# Patient Record
Sex: FEMALE | Race: BLACK OR AFRICAN AMERICAN | Employment: FULL TIME | URBAN - METROPOLITAN AREA
[De-identification: names, ages, dates, MRNs, and addresses within clinical notes are randomized per-mention and may not be internally consistent; named-entity substitution may affect disease eponyms.]

---

## 2017-03-27 ENCOUNTER — TELEPHONE (OUTPATIENT)
Dept: FAMILY MEDICINE CLINIC | Facility: CLINIC | Age: 52
End: 2017-03-27

## 2018-01-09 ENCOUNTER — HOSPITAL ENCOUNTER (OUTPATIENT)
Dept: MAMMOGRAPHY | Facility: HOSPITAL | Age: 53
Discharge: HOME | End: 2018-01-09
Attending: FAMILY MEDICINE
Payer: COMMERCIAL

## 2018-01-09 DIAGNOSIS — Z12.39 ENCOUNTER FOR BREAST CANCER SCREENING OTHER THAN MAMMOGRAM: ICD-10-CM

## 2018-01-09 PROCEDURE — 77067 SCR MAMMO BI INCL CAD: CPT | Performed by: FAMILY MEDICINE

## 2019-04-30 ENCOUNTER — HOSPITAL ENCOUNTER (OUTPATIENT)
Dept: MAMMOGRAPHY | Facility: HOSPITAL | Age: 54
Discharge: HOME | End: 2019-04-30
Attending: FAMILY MEDICINE
Payer: COMMERCIAL

## 2019-04-30 DIAGNOSIS — Z12.39 BREAST SCREENING: ICD-10-CM

## 2019-04-30 PROCEDURE — 77067 SCR MAMMO BI INCL CAD: CPT | Performed by: FAMILY MEDICINE

## 2019-05-21 ENCOUNTER — OFFICE VISIT (OUTPATIENT)
Dept: INTERNAL MEDICINE CLINIC | Facility: HOSPITAL | Age: 54
End: 2019-05-21

## 2019-05-21 NOTE — PROGRESS NOTES
Progress Notes           Patient: Tri Russ Provider: Gabby Preston DO   : 1965 Age: 48 Y Sex: Female Date: 2019   PCP: Murray Cochran MD              Reason for Appointment   1. Patient presents for a CPX.  Shane Gerard RN Surgical History   appendectomy    laparoscopy    hysteroscopy          Family History   Father: alive, prostate cancer, diabetes mellitus   Mother: alive, hypertension, hypercholesterolemia, osteoporosis   Paternal [address] Father: , pros Recreational drug use  Do you use any recreational or illegal drugs? No, Have you or any of your family member has been hospitalized for any psychiatric problem? No, Have you ever used needles to inject drugs?   No.   Tobacco Use  Are you a:  never smoker (32.0-36.0 g/dL)  34.2  (32.0-36.0 g/dL)  34.1  (32.0-36.0 g/dL)    RDW 13  (11-15 percent)  13.5  (11.0-15.0 %)  13.9  (11.0-15.0 %)  13.4  (11.0-15.0 %)  13.6  (11.0-15.0 %)    PLATELET COUNT 320  (150-450 k/cu mm)  296  (140-400 Thousand/uL)  244  (140- Notes: MARY TODD ,  01/18/2018 11:13:56 AM > Let her know, her labs are back. Her cholesterol levels did go up from last time. Her LDL went up from 113 to 142.  According to the guidelines for cardiology, (ASCVD) she does NOT have to begin medication is a live viral vaccine. I am checking with your ID doctors to see what they say. RHONDA Samuels, Get there ID doc on the phone for me.  HERMILO ARCOS 9/7/2010 10:52:42 PM > Send to infertility specialist and have her follow up with thte specialist. Check with h (21-33 mmol/L)  23  (21-33 mmol/L)    CALCIUM 9.4  (8.4-10.5 mg/dL)  9.2  (8.6-10.2 mg/dL)  9.7  (8.6-10.2 mg/dL)  9.2  (8.6-10.2 mg/dL)  9.3  (8.6-10.2 mg/dL)    PROTEIN, TOTAL 6.7  (6.0-8.3 gm/dL)  7.1  (6.1-8.1 g/dL)  6.8  (6.2-8.3 g/dL)  7.3  (6.2-8.3 ABDOMEN: Soft, non-tender, non-distended with no rigidity, no rebound, no guarding, bowel sounds are present throughout the abdomen         EXTREMITIES: No peripheral edema, no cyanosis         NEUROLOGIC: Alert and oriented x 3, CN's II-XII grossly LAB: TSH*       LAB: CBC (INCLUDES DIFF/PLT)*       LAB: LIPID PANEL (AMA)*       LAB: COMP METABOLIC PANEL*       LAB: T4, FREE*       LAB: VITAMIN D, 25-OH (TOTAL D2/D3)*     4.  Encounter for screening for diabetes mellitus         LAB: HEMOGLOBIN A Notes: She states she does not have a  for her colonoscopy, so I offered Cologuard. She did sign the application and we will send it in- I gave her the information on the testing.         11. Others     Notes: Treatment plan including therapeutic opti

## 2021-12-17 ENCOUNTER — HOSPITAL ENCOUNTER (OUTPATIENT)
Dept: MAMMOGRAPHY | Age: 56
Discharge: HOME | End: 2021-12-17
Attending: NURSE PRACTITIONER
Payer: COMMERCIAL

## 2021-12-17 DIAGNOSIS — Z12.31 ENCOUNTER FOR SCREENING MAMMOGRAM FOR MALIGNANT NEOPLASM OF BREAST: ICD-10-CM

## 2021-12-17 PROCEDURE — 77067 SCR MAMMO BI INCL CAD: CPT | Performed by: NURSE PRACTITIONER

## 2021-12-17 PROCEDURE — 77063 BREAST TOMOSYNTHESIS BI: CPT | Performed by: NURSE PRACTITIONER

## 2024-03-13 ENCOUNTER — APPOINTMENT (OUTPATIENT)
Dept: CT IMAGING | Facility: HOSPITAL | Age: 59
End: 2024-03-13
Attending: EMERGENCY MEDICINE
Payer: COMMERCIAL

## 2024-03-13 ENCOUNTER — HOSPITAL ENCOUNTER (EMERGENCY)
Facility: HOSPITAL | Age: 59
Discharge: HOME | End: 2024-03-13
Attending: EMERGENCY MEDICINE
Payer: COMMERCIAL

## 2024-03-13 VITALS
OXYGEN SATURATION: 100 % | BODY MASS INDEX: 26.58 KG/M2 | WEIGHT: 150 LBS | SYSTOLIC BLOOD PRESSURE: 193 MMHG | DIASTOLIC BLOOD PRESSURE: 100 MMHG | TEMPERATURE: 97 F | RESPIRATION RATE: 20 BRPM | HEART RATE: 74 BPM | HEIGHT: 63 IN
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
## 2024-03-13 DIAGNOSIS — R03.0 TRANSIENT HYPERTENSION: ICD-10-CM

## 2024-03-13 DIAGNOSIS — M62.838 SPASM OF CERVICAL PARASPINOUS MUSCLE: Primary | ICD-10-CM

## 2024-03-13 DIAGNOSIS — M54.12 CERVICAL RADICULOPATHY: ICD-10-CM

## 2024-03-13 PROCEDURE — 70450 CT HEAD/BRAIN W/O DYE: CPT | Performed by: EMERGENCY MEDICINE

## 2024-03-13 PROCEDURE — 99284 EMERGENCY DEPT VISIT MOD MDM: CPT

## 2024-03-13 PROCEDURE — 72125 CT NECK SPINE W/O DYE: CPT | Performed by: EMERGENCY MEDICINE

## 2024-03-13 RX ORDER — METHYLPREDNISOLONE 4 MG/1
TABLET ORAL
Qty: 1 EACH | Refills: 0 | Status: SHIPPED | OUTPATIENT
Start: 2024-03-13

## 2024-03-13 RX ORDER — CYCLOBENZAPRINE HCL 10 MG
10 TABLET ORAL 3 TIMES DAILY PRN
Qty: 20 TABLET | Refills: 0 | Status: SHIPPED | OUTPATIENT
Start: 2024-03-13 | End: 2024-03-20
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
## 2024-03-14 NOTE — DISCHARGE INSTRUCTIONS
Please check your BP and keep a log  Call Dr Blankenship for follow up  May take tylenol     Icy hot patches with lidocaine to low back and neck
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
## (undated) NOTE — MR AVS SNAPSHOT
After Visit Summary   5/21/2019    Elvis Devlin    MRN: [id number]           Visit Information     Date & Time  5/21/2019 11:34 AM Provider  Heidy Bartholomew, [facility].  Phone        Allergies staff. Remember, [address] is NOT to be used for urgent needs. For medical emergencies, dial 911.     Sincerely,    Dougie Mayas, DO             Educational Information    Healthy Diet and Regular Exercise  [facility] [email or web address] [facility] [employer] heal If you receive a survey from [employer], please take a few minutes to complete it and provide feedback. We strive to deliver the best patient experience and are looking for ways to make improvements. Your feedback will help us do so.  For more information